# Patient Record
Sex: FEMALE | URBAN - METROPOLITAN AREA
[De-identification: names, ages, dates, MRNs, and addresses within clinical notes are randomized per-mention and may not be internally consistent; named-entity substitution may affect disease eponyms.]

---

## 2021-11-22 ENCOUNTER — OFFICE VISIT (OUTPATIENT)
Dept: URBAN - METROPOLITAN AREA CLINIC 56 | Facility: CLINIC | Age: 46
End: 2021-11-22
Payer: COMMERCIAL

## 2021-11-22 DIAGNOSIS — H52.13 MYOPIA, BILATERAL: ICD-10-CM

## 2021-11-22 DIAGNOSIS — H04.123 DRY EYE SYNDROME OF BILATERAL LACRIMAL GLANDS: Primary | ICD-10-CM

## 2021-11-22 PROCEDURE — 92004 COMPRE OPH EXAM NEW PT 1/>: CPT | Performed by: STUDENT IN AN ORGANIZED HEALTH CARE EDUCATION/TRAINING PROGRAM

## 2021-11-22 ASSESSMENT — KERATOMETRY
OS: 42.64
OD: 42.80

## 2021-11-22 ASSESSMENT — VISUAL ACUITY
OD: 20/20
OS: 20/20

## 2021-11-22 NOTE — IMPRESSION/PLAN
Impression: Dry eye syndrome of bilateral lacrimal glands: H04.123. Plan: continue artificial tears PRN. Using BID.